# Patient Record
Sex: MALE | Race: ASIAN | NOT HISPANIC OR LATINO | URBAN - METROPOLITAN AREA
[De-identification: names, ages, dates, MRNs, and addresses within clinical notes are randomized per-mention and may not be internally consistent; named-entity substitution may affect disease eponyms.]

---

## 2019-09-14 ENCOUNTER — EMERGENCY (EMERGENCY)
Facility: HOSPITAL | Age: 24
LOS: 1 days | Discharge: HOME | End: 2019-09-14
Attending: EMERGENCY MEDICINE
Payer: OTHER GOVERNMENT

## 2019-09-14 VITALS
SYSTOLIC BLOOD PRESSURE: 134 MMHG | DIASTOLIC BLOOD PRESSURE: 94 MMHG | HEART RATE: 77 BPM | OXYGEN SATURATION: 100 % | RESPIRATION RATE: 16 BRPM

## 2019-09-14 VITALS
SYSTOLIC BLOOD PRESSURE: 130 MMHG | RESPIRATION RATE: 18 BRPM | HEIGHT: 70 IN | OXYGEN SATURATION: 100 % | TEMPERATURE: 98 F | DIASTOLIC BLOOD PRESSURE: 85 MMHG | HEART RATE: 75 BPM | WEIGHT: 160.06 LBS

## 2019-09-14 LAB
APPEARANCE UR: CLEAR — SIGNIFICANT CHANGE UP
BACTERIA # UR AUTO: NEGATIVE — SIGNIFICANT CHANGE UP
BILIRUB UR-MCNC: NEGATIVE — SIGNIFICANT CHANGE UP
COLOR SPEC: SIGNIFICANT CHANGE UP
DIFF PNL FLD: NEGATIVE — SIGNIFICANT CHANGE UP
EPI CELLS # UR: 0 /HPF — SIGNIFICANT CHANGE UP
GLUCOSE UR QL: NEGATIVE — SIGNIFICANT CHANGE UP
HYALINE CASTS # UR AUTO: 0 /LPF — SIGNIFICANT CHANGE UP (ref 0–2)
KETONES UR-MCNC: NEGATIVE — SIGNIFICANT CHANGE UP
LEUKOCYTE ESTERASE UR-ACNC: NEGATIVE — SIGNIFICANT CHANGE UP
NITRITE UR-MCNC: NEGATIVE — SIGNIFICANT CHANGE UP
PH UR: 6 — SIGNIFICANT CHANGE UP (ref 5–8)
PROT UR-MCNC: NEGATIVE — SIGNIFICANT CHANGE UP
RBC CASTS # UR COMP ASSIST: 1 /HPF — SIGNIFICANT CHANGE UP (ref 0–4)
SP GR SPEC: 1.02 — SIGNIFICANT CHANGE UP (ref 1.01–1.02)
UROBILINOGEN FLD QL: NEGATIVE — SIGNIFICANT CHANGE UP
WBC UR QL: 1 /HPF — SIGNIFICANT CHANGE UP (ref 0–5)

## 2019-09-14 PROCEDURE — 74176 CT ABD & PELVIS W/O CONTRAST: CPT

## 2019-09-14 PROCEDURE — 99284 EMERGENCY DEPT VISIT MOD MDM: CPT

## 2019-09-14 PROCEDURE — 74176 CT ABD & PELVIS W/O CONTRAST: CPT | Mod: 26

## 2019-09-14 PROCEDURE — 81001 URINALYSIS AUTO W/SCOPE: CPT

## 2019-09-14 PROCEDURE — 99284 EMERGENCY DEPT VISIT MOD MDM: CPT | Mod: 25

## 2019-09-14 NOTE — ED PROVIDER NOTE - NSFOLLOWUPINSTRUCTIONS_ED_ALL_ED_FT
Follow up with your primary care in 2-3 days -- call to discuss.    Take Ibuprofen and/or Acetaminophen as directed -- see medication warnings.    See EPIPLOIC APPENDAGITIS information and return instructions given to you.    Seek immediate medical care for new/worsening symptoms/concerns.

## 2019-09-14 NOTE — ED PROVIDER NOTE - PHYSICAL EXAMINATION
Gen: AAOx3, non-toxic  Head: NCAT  HEENT: EOMI, oral mucosa moist, normal conjunctiva  Lung: CTAB, no respiratory distress, no wheezes/rhonchi/rales B/L, speaking in full sentences  CV: RRR, no murmurs, rubs or gallops  Abd: LLQ TTP, no rebound T, NTND, no guarding, no CVA tenderness  : No testicular pain, redness or swelling, no urethral discharge.   MSK: no visible deformities  Neuro: No focal sensory or motor deficits, normal CN exam   Skin: Warm, well perfused, no rash  Psych: normal affect.

## 2019-09-14 NOTE — ED PROVIDER NOTE - ATTENDING CONTRIBUTION TO CARE
------------ATTENDING NOTE------------  pt w/ progressively increasing mild/moderate LLQ pain, no fevers or systemic symptoms, c/w CT findings, nml VS, tolerating PO, in depth dw pt about ddx, tx, zavala, continued close outpt fu.  - Damian Carlton MD   -----------------------------------------------

## 2019-09-14 NOTE — ED PROVIDER NOTE - PATIENT PORTAL LINK FT
You can access the FollowMyHealth Patient Portal offered by [facility] by registering at the following website: [email or web address]. By joining [employer]’s FollowMyHealth portal, you will also be able to view your health information using other applications (apps) compatible with our system.

## 2019-09-14 NOTE — ED CLERICAL - NS ED CLERK NOTE PRE-ARRIVAL INFORMATION; ADDITIONAL PRE-ARRIVAL INFORMATION
CC/Reason For referral: 4 days left quadrant pain  Preferred Consultant(if applicable):  Who admits for you (if needed):  Do you have documents you would like to fax over? yes  Would you still like to speak to an ED attending? no

## 2019-09-14 NOTE — ED PROVIDER NOTE - NS ED ROS FT
GENERAL: No fever or chills  EYES: no change in vision  HEENT: no trouble swallowing or speaking  CARDIAC: no chest pain or palpiations   PULMONARY: no cough or SOB  GI: See HPI, no nausea, vomiting, diarrhea, or constipation   : No changes in urination  SKIN: no rashes  NEURO: no headache, numbness, or weakness.  MSK: No joint pain

## 2019-09-14 NOTE — ED ADULT NURSE NOTE - OBJECTIVE STATEMENT
23 year old male no PMH presents to ED c/o LLQ pain since Wednesday.  He said pain is worse when he bends over and stands up.  He said pain is better at rest.  He took GasX last night for pain with no relief of pain.  Reports tolerating po intake well.  He denies: fevers, n/v/d, blood in stool, testicular pain, penile discharge.

## 2019-09-14 NOTE — ED PROVIDER NOTE - CLINICAL SUMMARY MEDICAL DECISION MAKING FREE TEXT BOX
22 yo M with no PMH presents with progressively worsening LLQ abdominal pain for the last 4 years with no assocaited N,V, D, Fever, or testicular pain. Vital signs WNL. Likely secondary to diverticulitis vs colitis vs nephrolithiasis given location and duration of pain. No concern for testicular torsion. Will do Abd CT and re-evaluate. progressively worsening LLQ abdominal pain for the last 4 years with no assocaited N,V, D, Fever, or testicular pain. Vital signs WNL. Likely secondary to diverticulitis vs colitis vs nephrolithiasis given location and duration of pain. No concern for testicular torsion. Will do Abd CT and re-evaluate.  CT showing epiploic appendagitis. Patient tolerating PO, VS WNL, Discussed results with patient, who will f/u with PCP.

## 2019-09-14 NOTE — ED PROVIDER NOTE - OBJECTIVE STATEMENT
24 yo M with no PMH presents with progressively worsening LLQ abdo pain for the last 4 years. Described as "gas sensation", worse when standing, 1/10 now, 7/10 at other times. Non-radiating. Took Gas-x with no improvement.  No N,V or D. Last BM today with no blood. No fevers. No flank pain, dysuria, or hematuria. No testicular pain. no penile discharge. Sexually active, no STD hx.